# Patient Record
Sex: FEMALE | Race: OTHER | Employment: UNEMPLOYED | URBAN - METROPOLITAN AREA
[De-identification: names, ages, dates, MRNs, and addresses within clinical notes are randomized per-mention and may not be internally consistent; named-entity substitution may affect disease eponyms.]

---

## 2017-02-22 ENCOUNTER — OFFICE VISIT (OUTPATIENT)
Dept: ORTHOPEDIC SURGERY | Age: 52
End: 2017-02-22

## 2017-02-22 VITALS
BODY MASS INDEX: 28.5 KG/M2 | HEART RATE: 70 BPM | SYSTOLIC BLOOD PRESSURE: 126 MMHG | DIASTOLIC BLOOD PRESSURE: 88 MMHG | WEIGHT: 171.08 LBS | HEIGHT: 65 IN

## 2017-02-22 DIAGNOSIS — T84.84XA PAINFUL ORTHOPAEDIC HARDWARE (HCC): ICD-10-CM

## 2017-02-22 DIAGNOSIS — M25.531 WRIST PAIN, RIGHT: Primary | ICD-10-CM

## 2017-02-22 DIAGNOSIS — S52.501D CLOSED FRACTURE OF DISTAL END OF RIGHT RADIUS WITH ROUTINE HEALING, UNSPECIFIED FRACTURE MORPHOLOGY, SUBSEQUENT ENCOUNTER: ICD-10-CM

## 2017-02-22 PROCEDURE — 73110 X-RAY EXAM OF WRIST: CPT | Performed by: ORTHOPAEDIC SURGERY

## 2017-02-22 PROCEDURE — 99213 OFFICE O/P EST LOW 20 MIN: CPT | Performed by: ORTHOPAEDIC SURGERY

## 2019-08-12 ENCOUNTER — OFFICE VISIT (OUTPATIENT)
Dept: ORTHOPEDIC SURGERY | Age: 54
End: 2019-08-12
Payer: COMMERCIAL

## 2019-08-12 VITALS — WEIGHT: 180 LBS | HEIGHT: 65 IN | BODY MASS INDEX: 29.99 KG/M2 | RESPIRATION RATE: 16 BRPM

## 2019-08-12 DIAGNOSIS — M79.672 LEFT FOOT PAIN: Primary | ICD-10-CM

## 2019-08-12 DIAGNOSIS — S93.602A FOOT SPRAIN, LEFT, INITIAL ENCOUNTER: ICD-10-CM

## 2019-08-12 PROCEDURE — 99213 OFFICE O/P EST LOW 20 MIN: CPT | Performed by: PHYSICIAN ASSISTANT

## 2020-09-20 ENCOUNTER — APPOINTMENT (OUTPATIENT)
Dept: GENERAL RADIOLOGY | Age: 55
End: 2020-09-20
Payer: COMMERCIAL

## 2020-09-20 ENCOUNTER — HOSPITAL ENCOUNTER (EMERGENCY)
Age: 55
Discharge: HOME | End: 2020-09-21
Attending: STUDENT IN AN ORGANIZED HEALTH CARE EDUCATION/TRAINING PROGRAM
Payer: COMMERCIAL

## 2020-09-20 LAB
A/G RATIO: 1.6 (ref 1.1–2.2)
ACETAMINOPHEN LEVEL: <5 UG/ML (ref 10–30)
ALBUMIN SERPL-MCNC: 4.6 G/DL (ref 3.4–5)
ALP BLD-CCNC: 71 U/L (ref 40–129)
ALT SERPL-CCNC: 10 U/L (ref 10–40)
AMPHETAMINE SCREEN, URINE: NORMAL
ANION GAP SERPL CALCULATED.3IONS-SCNC: 11 MMOL/L (ref 3–16)
AST SERPL-CCNC: 21 U/L (ref 15–37)
BARBITURATE SCREEN URINE: NORMAL
BASOPHILS ABSOLUTE: 0.1 K/UL (ref 0–0.2)
BASOPHILS RELATIVE PERCENT: 1 %
BENZODIAZEPINE SCREEN, URINE: NORMAL
BILIRUB SERPL-MCNC: <0.2 MG/DL (ref 0–1)
BUN BLDV-MCNC: 11 MG/DL (ref 7–20)
CALCIUM SERPL-MCNC: 9.4 MG/DL (ref 8.3–10.6)
CANNABINOID SCREEN URINE: NORMAL
CHLORIDE BLD-SCNC: 103 MMOL/L (ref 99–110)
CO2: 27 MMOL/L (ref 21–32)
COCAINE METABOLITE SCREEN URINE: NORMAL
CREAT SERPL-MCNC: 0.7 MG/DL (ref 0.6–1.1)
EOSINOPHILS ABSOLUTE: 0.1 K/UL (ref 0–0.6)
EOSINOPHILS RELATIVE PERCENT: 1.7 %
ETHANOL: 251 MG/DL (ref 0–0.08)
GFR AFRICAN AMERICAN: >60
GFR NON-AFRICAN AMERICAN: >60
GLOBULIN: 2.8 G/DL
GLUCOSE BLD-MCNC: 133 MG/DL (ref 70–99)
HCG QUALITATIVE: NEGATIVE
HCT VFR BLD CALC: 38.7 % (ref 36–48)
HEMOGLOBIN: 12.2 G/DL (ref 12–16)
LYMPHOCYTES ABSOLUTE: 3.5 K/UL (ref 1–5.1)
LYMPHOCYTES RELATIVE PERCENT: 51.8 %
Lab: NORMAL
MCH RBC QN AUTO: 22.8 PG (ref 26–34)
MCHC RBC AUTO-ENTMCNC: 31.4 G/DL (ref 31–36)
MCV RBC AUTO: 72.6 FL (ref 80–100)
METHADONE SCREEN, URINE: NORMAL
MONOCYTES ABSOLUTE: 0.3 K/UL (ref 0–1.3)
MONOCYTES RELATIVE PERCENT: 5.1 %
NEUTROPHILS ABSOLUTE: 2.7 K/UL (ref 1.7–7.7)
NEUTROPHILS RELATIVE PERCENT: 40.4 %
OPIATE SCREEN URINE: NORMAL
OXYCODONE URINE: NORMAL
PDW BLD-RTO: 15.5 % (ref 12.4–15.4)
PH UA: 5
PHENCYCLIDINE SCREEN URINE: NORMAL
PLATELET # BLD: 291 K/UL (ref 135–450)
PMV BLD AUTO: 8.9 FL (ref 5–10.5)
POTASSIUM REFLEX MAGNESIUM: 4.1 MMOL/L (ref 3.5–5.1)
PROPOXYPHENE SCREEN: NORMAL
RBC # BLD: 5.33 M/UL (ref 4–5.2)
SALICYLATE, SERUM: <0.3 MG/DL (ref 15–30)
SODIUM BLD-SCNC: 141 MMOL/L (ref 136–145)
TOTAL PROTEIN: 7.4 G/DL (ref 6.4–8.2)
TROPONIN: <0.01 NG/ML
WBC # BLD: 6.7 K/UL (ref 4–11)

## 2020-09-20 PROCEDURE — 80053 COMPREHEN METABOLIC PANEL: CPT

## 2020-09-20 PROCEDURE — 85025 COMPLETE CBC W/AUTO DIFF WBC: CPT

## 2020-09-20 PROCEDURE — G0480 DRUG TEST DEF 1-7 CLASSES: HCPCS

## 2020-09-20 PROCEDURE — 99284 EMERGENCY DEPT VISIT MOD MDM: CPT

## 2020-09-20 PROCEDURE — 84484 ASSAY OF TROPONIN QUANT: CPT

## 2020-09-20 PROCEDURE — 71046 X-RAY EXAM CHEST 2 VIEWS: CPT

## 2020-09-20 PROCEDURE — 84703 CHORIONIC GONADOTROPIN ASSAY: CPT

## 2020-09-20 PROCEDURE — 80307 DRUG TEST PRSMV CHEM ANLYZR: CPT

## 2020-09-20 PROCEDURE — 93005 ELECTROCARDIOGRAM TRACING: CPT | Performed by: PHYSICIAN ASSISTANT

## 2020-09-20 RX ORDER — PREDNISONE 1 MG/1
1 TABLET ORAL DAILY
COMMUNITY

## 2020-09-20 ASSESSMENT — HEART SCORE: ECG: 1

## 2020-09-21 VITALS
RESPIRATION RATE: 18 BRPM | SYSTOLIC BLOOD PRESSURE: 131 MMHG | TEMPERATURE: 97.8 F | HEIGHT: 65 IN | WEIGHT: 180 LBS | OXYGEN SATURATION: 96 % | BODY MASS INDEX: 29.99 KG/M2 | HEART RATE: 70 BPM | DIASTOLIC BLOOD PRESSURE: 98 MMHG

## 2020-09-21 LAB
EKG ATRIAL RATE: 76 BPM
EKG DIAGNOSIS: NORMAL
EKG P AXIS: 52 DEGREES
EKG P-R INTERVAL: 166 MS
EKG Q-T INTERVAL: 420 MS
EKG QRS DURATION: 72 MS
EKG QTC CALCULATION (BAZETT): 472 MS
EKG R AXIS: -2 DEGREES
EKG T AXIS: 25 DEGREES
EKG VENTRICULAR RATE: 76 BPM
ETHANOL: NORMAL MG/DL (ref 0–0.08)

## 2020-09-21 PROCEDURE — 93010 ELECTROCARDIOGRAM REPORT: CPT | Performed by: INTERNAL MEDICINE

## 2020-09-21 PROCEDURE — G0480 DRUG TEST DEF 1-7 CLASSES: HCPCS

## 2020-09-21 NOTE — ED NOTES
RN at pt bedside to assess pt and introduce self as primary RN pt tearful and states \" I just wanted to go to bed, my  has been nagging me to do housework and I haven't been feeling well the last two weeks and he is no supportive when I don't feel well. \"  Pt calm and cooperative at this time after speaking with RN pt has call button within reach, denies needs or concerns.  No distress noted at this time      Dante Phalen, RN  09/20/20 6974

## 2020-09-21 NOTE — ED NOTES
Collateral Contact:  Name: Reginaldo Lyon  Phone: [phone]  Relation to Patient:   Last Contact with Patient: Today  Concerns:  states pt drinks daily and she has taken pills and acted impulsively in the past when intoxicated. He states, \"She does this all the time. \"  states he would like pt to go to rehab but he does not believe she would go on her own. He denies that she has ever been admitted into a psychiatric facility. He states he does not know if pt taking the pills last evening was a suicide attempt. He states he does feel she would be safe if she returned home, however, believes she will continue to act impulsively when intoxicated. He states he does not feel she would benefit from a psychiatric admission and is hoping staff here can provide referrals to [facility].  states he will be home with pt at all times this evening and will help her follow up with any referrals given if discharged. , Reginaldo Lyon, would be supportive of a plan to discharge [facility].      [address] [place] [address]  09/21/20 4726

## 2020-09-21 NOTE — ED NOTES
Bed: 04  Expected date:   Expected time:   Means of arrival:   Comments:  Mary Grant, SONALI  09/20/20 2124

## 2020-09-21 NOTE — ED NOTES
2336 Perfect served hospitalist per Felipe Castellon 46 Dr. Eliot Yanez returned call to 143 Maryanne Khalil  09/20/20 2339       Gadiel  09/20/20 5622

## 2020-09-21 NOTE — ED PROVIDER NOTES
I independently examined and evaluated Tha Beverly. In brief, 58-year-old female with no past medical history presenting for ingestion. Patient has been following with her PCP for 2 weeks with severe symptoms of sinusitis and bronchitis. Patient is still having symptoms. She was recently started on prednisone and Singulair. She reports increased depression after starting prednisone. She also reports multiple members of her family passing away within the past 6 months. EMS was called by patient's friend when patient lost consciousness while on the phone with her friend. Patient endorses that she took 10-12 melatonin tablets that she believes are 5 mg. Patient denies this was a self-harm attempt, stating she just wanted to sleep. However she is very tearful on exam and reports significant depression. Patient only reports drinking 1 glass of wine this evening. Suicidal ideation: denies  Plan: denies  Previous attempts: denies  Homicidal ideation: denies  Access to firearms: denies  Audiovisual hallucinations: denies  Psychiatric medications: denies  Tobacco use: denies  Alcohol use: occasional  Illicit drug use: denies    Somatic complaints: denies new somatic complaints    Focused exam revealed patient is very tearful and emotionally labile. Cardiac regular rate and rhythm, lungs clear to auscultation bilaterally. Abdomen soft and nontender. Strength and sensation intact. Pupils equal and reactive to light. Patient is alert and oriented. She does not appear to be responding to internal stimuli. ED course:     Patient presenting with ingestion of melatonin. She denies this is a suicide attempt. However, patient reports significant depression and reports taking more medication than she knows she should. We will obtain ingestion labs. While patient denies this was a self-harm attempt, patient is extremely emotionally labile and reporting severe depression.   I do have concerns that given patient has ingested more tablets than she knows she should in the setting of severe depression, that this does potentially represent intentional self harming behavior. Patient also reported strong family history for cardiac disease with multiple family members dying within the past 6 months of cardiac causes. Patient is concerned about cardiac disease. She denies any shortness of breath or chest pain. EKG, chest x-ray, and troponin obtained. The Ekg interpreted by me shows  normal sinus rhythm with a rate of 76  Axis is   Left axis deviation  QTc is  within an acceptable range  Intervals and Durations are unremarkable. ST Segments: nonspecific changes  No previous for comparison    XR CHEST (2 VW)   Final Result   No acute process. Troponin was within normal limits. At this time, low suspicion for ACS. Patient is not endorsing any chest pain or shortness of breath, just endorses anxiety due to will family members recently dying from cardiac disease. Patient did have a exercise stress test in 2012 that was unremarkable. Heart score of 2, placing patient at low risk for acute major cardiac event in the next 6 weeks. Patient encouraged to follow-up with her primary care doctor regarding her concerns about her family cardiac history for further work-up as needed. Patient evaluated for ingestion. ED Course as of Sep 20 2311   Sun Sep 20, 2020   2259 Per ANNETTE Landis conversation with poison control, there is no concern for toxicity based off of the amount of melatonin that the patient took. [ER]   2300 Alcohol level elevated to 251. Patient reports she only drank 1 glass of wine. This alcohol level does not correspond with this amount of alcohol use. I discussed this with the patient and she continued to endorse that she only drank 1 glass of wine. [ER]   2302 Acetaminophen and salicylate levels negative.     [ER]   7612 No electrolyte abnormalities or evidence of kidney dysfunction.    [ER]   2303 Liver function testing unremarkable. [ER]   7720 Patient is not pregnant.    [ER]   [phone] No leukocytosis, anemia, or thrombocytopenia. [ER]   2309 Urine drug screen negative. [ER]      ED Course User Index  [CS] ANNETTE Figueroa  [ER] Yossi Michel MD     Patient cleared from an ingestion standpoint. Poison control stated that this was a nontoxic melatonin ingestion. Other ingestion labs unremarkable. Patient's alcohol is elevated. Do feel that she requires psychiatric evaluation and patient meets criteria for admission while obtaining sobriety. Discussed with hospitalist, this patient would require admission to the last available ICU bed. He discussed with the WALTER who is comfortable with the patient being placed in the [facility] while awaiting sobriety for psychiatric evaluation in the morning. At this time, I do feel this is the best use of hospital resources. We will plan for repeat evaluation of sobriety in the morning with plan for psychiatric evalualtation at that time. All diagnostic, treatment, and disposition decisions were made by myself in conjunction with the advanced practice provider. For all further details of the patient's emergency department visit, please see the advanced practice provider's documentation. Comment: Please note this report has been produced using speech recognition software and may contain errors related to that system including errors in grammar, punctuation, and spelling, as well as words and phrases that may be inappropriate. If there are any questions or concerns please feel free to contact the dictating provider for clarification.       Yossi Michel MD  09/21/20 0040

## 2020-09-21 NOTE — ED PROVIDER NOTES
[address] ED  EMERGENCY DEPARTMENT ENCOUNTER        Pt Name: Nacho Turner  MRN: [id number]  [place] 1965  Date of evaluation: 9/20/2020  Provider: ANNETTE Pizano  PCP: No primary care provider on file. This patient was seen and evaluated by the attending physician Too Jewell MD.    I have evaluated this patient. My supervising physician was available for consultation. CHIEF COMPLAINT       Chief Complaint   Patient presents with    Ingestion     pt took handful of melatonin gummies, pt is wanting to get away from , pt is also intoxicated, states drank one glass of wine       HISTORY OF PRESENT ILLNESS   (Location/Symptom, Timing/Onset, Context/Setting, Quality, Duration, Modifying Factors, Severity)  Note limiting factors. Nacho Turner is a 63 y.o. female who presents via EMS from her home for evaluation of an ingestion. Around 9 PM today patient took 10-12 melatonin. She is not entirely sure what the dose was she notes approximately 5 mg she thinks. She denies doing this to harm herself she notes \"I just wanted to sleep\". She notes that she has been treated for the last several weeks for different URI symptoms, her PCP has put her on antibiotics for sinusitis, bronchopneumonia, bronchospasm. She was just recently started on prednisone and Singulair, she took that yesterday for the first time. She noted that when she took the prednisone she felt \"very depressed\". Again, patient denies any suicidal or homicidal thoughts or ideation and denies intent for self-harm however she also notes that she drank only 1 glass of wine today. She notes that she was on the phone with a friend when she suddenly fell asleep and started snoring and the friend called 911 which brought her to the emergency department. Currently she denies any headaches vision changes nausea vomiting abdominal pain. She endorses cough and chronic runny nose and nasal congestion. She also notes that multiple people in her family have  from heart attack, she does not think that this is related but wanted us to be aware. He denies any chest pain or shortness of breath. She denies any other drug use. She denies prior attempt at self-harm. She denies past medical history of depression but endorses past medical history of anxiety she does not take anything on a regular basis for this. She denies any visual or auditory hallucinations. Nursing Notes were all reviewed and agreed with or any disagreements were addressed  in the HPI. REVIEW OF SYSTEMS    (2-9 systems for level 4, 10 or more for level 5)     Review of Systems    Positives and Pertinent negatives as per HPI. Except as noted abovein the ROS, all other systems were reviewed and negative. PAST MEDICAL HISTORY     Past Medical History:   Diagnosis Date    Endometriosis     Fe deficiency anemia     Irregular uterine bleeding          SURGICAL HISTORY     Past Surgical History:   Procedure Laterality Date    ARM SURGERY Right 2016    orif distal radial fracture    HYSTERECTOMY  2008    subtotal    PELVIC LAPAROSCOPY           CURRENTMEDICATIONS       Previous Medications    DEXLANSOPRAZOLE (DEXILANT) 60 MG CPDR DELAYED RELEASE CAPSULE    Take 1 capsule by mouth daily    IBUPROFEN (ADVIL;MOTRIN) 600 MG TABLET    Take 1 tablet by mouth every 6 hours as needed for Pain    PHENAZOPYRIDINE (PYRIDIUM) 200 MG TABLET    Take 200 mg by mouth 3 times daily as needed for Pain    PREDNISONE (DELTASONE) 1 MG TABLET    Take 1 mg by mouth daily         ALLERGIES     Patient has no known allergies.     FAMILYHISTORY       Family History   Problem Relation Age of Onset    Heart Disease Mother     Stroke Mother     Cancer Brother         lung    High Blood Pressure Sister     High Cholesterol Paternal Grandmother     Heart Disease Brother           SOCIAL HISTORY       Social History     Socioeconomic History    She is not in acute distress. Appearance: Normal appearance. She is well-developed. She is not ill-appearing, toxic-appearing or diaphoretic. Interventions: She is not intubated. HENT:      Head: Normocephalic and atraumatic. Right Ear: Tympanic membrane, ear canal and external ear normal.      Left Ear: Tympanic membrane, ear canal and external ear normal.      Nose: Rhinorrhea present. No congestion. Mouth/Throat:      Mouth: Mucous membranes are moist.      Pharynx: Oropharynx is clear. No oropharyngeal exudate or posterior oropharyngeal erythema. Eyes:      General:         Right eye: No discharge. Left eye: No discharge. Extraocular Movements: Extraocular movements intact. Conjunctiva/sclera: Conjunctivae normal.      Pupils: Pupils are equal, round, and reactive to light. Neck:      Musculoskeletal: Normal range of motion and neck supple. No neck rigidity or muscular tenderness. Cardiovascular:      Rate and Rhythm: Normal rate and regular rhythm. Pulses: Normal pulses. Radial pulses are 2+ on the right side and 2+ on the left side. Posterior tibial pulses are 2+ on the right side and 2+ on the left side. Heart sounds: Normal heart sounds. No murmur. No friction rub. No gallop. Pulmonary:      Effort: Pulmonary effort is normal. No tachypnea, bradypnea, accessory muscle usage, prolonged expiration, respiratory distress or retractions. She is not intubated. Breath sounds: Normal breath sounds and air entry. No stridor. No decreased breath sounds, wheezing, rhonchi or rales. Comments: Mild intermittent nonproductive cough. Abdominal:      Palpations: Abdomen is soft. Tenderness: There is no abdominal tenderness. There is no right CVA tenderness, left CVA tenderness, guarding or rebound. Musculoskeletal:      Right lower leg: No edema. Left lower leg: No edema. Lymphadenopathy:      Cervical: No cervical adenopathy. Skin:     General: Skin is warm and dry. Capillary Refill: Capillary refill takes less than 2 seconds. Findings: No lesion or rash. Neurological:      General: No focal deficit present. Mental Status: She is alert, oriented to person, place, and time and easily aroused. GCS: GCS eye subscore is 4. GCS verbal subscore is 5. GCS motor subscore is 6. Cranial Nerves: No dysarthria or facial asymmetry. Sensory: Sensation is intact. Motor: Motor function is intact. No tremor, abnormal muscle tone or seizure activity. Coordination: Finger-Nose-Finger Test and Heel to [employer] normal. Rapid alternating movements normal.   Psychiatric:         Attention and Perception: Attention and perception normal.         Mood and Affect: Mood is anxious. Affect is tearful. Speech: Speech normal.         Behavior: Behavior is cooperative. Thought Content: Thought content is not paranoid or delusional. Thought content includes suicidal (Questionable intent to self harm) ideation. Thought content does not include homicidal ideation. Thought content includes suicidal (Questionable intent to self harm ) plan. Thought content does not include homicidal plan. Cognition and Memory: Cognition and memory normal.         Judgment: Judgment is impulsive.            DIAGNOSTIC RESULTS   LABS:    Labs Reviewed   CBC WITH AUTO DIFFERENTIAL - Abnormal; Notable for the following components:       Result Value    RBC 5.33 (*)     MCV 72.6 (*)     MCH 22.8 (*)     RDW 15.5 (*)     All other components within normal limits    Narrative:     Performed at:  [facility],  [place], [facility]   Phone [phone]   COMPREHENSIVE METABOLIC PANEL W/ REFLEX TO MG FOR LOW K - Abnormal; Notable for the following components:    Glucose 133 (*)     All other components within normal limits    Narrative:     Performed at:  [address] [place] [address] [address]  [place], [place]   Phone [phone]   SALICYLATE LEVEL - Abnormal; Notable for the following components:    Salicylate, Serum <3.6 (*)     All other components within normal limits    Narrative:     Performed at:  [facility],  [place], [place]   Phone [phone]   ACETAMINOPHEN LEVEL - Abnormal; Notable for the following components:    Acetaminophen Level <5 (*)     All other components within normal limits    Narrative:     Performed at:  [facility] [address],  [place], [place]   Phone [phone]   HCG, SERUM, QUALITATIVE    Narrative:     Performed at:  [facility] [address],  [place], [place]   Phone [phone]   URINE DRUG SCREEN    Narrative:     Performed at:  [facility] [address],  [place], [place]   Phone [phone]   ETHANOL    Narrative:     Performed at:  [facility],  [place], [place]   Phone [phone]   TROPONIN    Narrative:     Performed at:  [facility]) [facility],  [place], [place]   Phone [phone]   ETHANOL       All other labs were within normal range or not returned as of this dictation. EKG: All EKG's are interpreted by the Emergency Department Physician who either signs orCo-signs this chart in the absence of a cardiologist.  Please see their note for interpretation of EKG. RADIOLOGY:   Non-plain film images such as CT, Ultrasound and MRI are read by the radiologist. Bird Tejeda radiographic images are visualized andpreliminarily interpreted by the  ED Provider with the below findings:        Interpretation perthe Radiologist below, if available at the time of this note:    XR CHEST (2 VW)   Final Result   No acute process.            Umu Lomas Chest (2 Vw)    Result Date: 9/20/2020  EXAMINATION: TWO XRAY VIEWS OF THE CHEST 9/20/2020 10:24 pm COMPARISON: None. HISTORY: ORDERING SYSTEM PROVIDED HISTORY: cough TECHNOLOGIST PROVIDED HISTORY: Reason for exam:->cough Reason for Exam: Ingestion (pt took handful of melatonin gummies, pt is wanting to get away from , pt is also intoxicated, states drank one glass of wine) FINDINGS: The lungs are without acute focal process. There is no effusion or pneumothorax. The cardiomediastinal silhouette is without acute process. The osseous structures are without acute process. No acute process. PROCEDURES   Unless otherwise noted below, none     Procedures    CRITICAL CARE TIME   N/A    CONSULTS:  IP CONSULT TO HOSPITALIST      EMERGENCY DEPARTMENT COURSE and DIFFERENTIALDIAGNOSIS/MDM:   Vitals:    Vitals:    09/20/20 2129 09/20/20 2136 09/20/20 2305 09/21/20 0105   BP: 99/71 121/81 110/74 102/73   Pulse: 71  72 68   Resp: 18      Temp: 97.8 °F (36.6 °C)      TempSrc: Oral      SpO2: 96% 96% 96% 94%   Weight: 180 lb (81.6 kg)      Height: 5' 5\" (1.651 m)          Patient was given thefollowing medications:  Medications - No data to display    PDMP Monitoring:    Last PDMP Blayne Christensen as Reviewed [facility]):  Review User Review Instant Review Result          Last Controlled Substance Monitoring Documentation      ED from 9/20/2016 in [facility]  ED   Attestation  The Prescription Monitoring Report for this patient was reviewed today. filed at 09/20/2016 2243   Periodic Controlled Substance Monitoring  Possible medication side effects, risk of tolerance and/or dependence, and alternative treatments discussed, No signs of potential drug abuse or diversion identified.  filed at 09/20/2016 0813        Urine Drug Screenings (1 yr)     Urine Drug Screen  Collected: 9/20/2020 10:02 PM (Final result)    Narrative:  Performed at:  [facility]) - [place] [address],  [place], [place] [address]   Phone [phone]    Complete Results              Medication Contract and Consent for Opioid Use Documents Filed      No documents found                MDM:   Patient seen and evaluated. Old records reviewed. Diagnostic testing reviewed and results discussed. Patient is a 58-year-old female who presents for evaluation of ingestion. On exam she is alert oriented afebrile well-perfused hemodynamically stable nontoxic in appearance. She remains with stable vital signs throughout her ER stay. On my interview and discussion with the patient she is tearful, noting that she did not intent to harm herself however she also notes that she drank only 1 glass of wine and her alcohol level is 250. I have concern regarding her truthfulness and I have concern that she did take the melatonin with the intent to harm herself. She has no focal neuro deficits her abdomen soft nontender no peritoneal signs, no other sign of self-harm or self injury. Patient notes that she is fearful that she is suffering from heart disease however she has not ever been seen by a cardiologist.  Given this fever and her concern and the fact that there can be some mimic of URI symptoms with ACS in adult woman I did obtain a troponin and twelve-lead EKG; troponin is negative, her twelve-lead is negative for sign of acute ischemia today and her chest x-ray is without focal process effusion without focal process to the cardiomediastinal silhouette, no acute process identified. Other drugs of abuse are negative. I did speak with Mani Clark, pharmacist at [address] [place] [address],[address] control who noted that melatonin toxicity level in children is not considered until 375 mg have been reached. Even if patient took the maximum over-the-counter dose of 20 mg and took that it 12 pills she is still just barely over this toxic level in a small child let alone what it would be an adult. Little concern given for melatonin toxicity currently.       At this time I do advise that patient be evaluated by behavioral health however her alcohol level is prohibiting proper evaluation at this time. Patient will be observed in the emergency department and alcohol tested in several hours. She is agreeable to this. 1:45 AM: I discussed the history, physical, and treatment plan with Dr. Kandice Templeokoor was signed out in stable condition. Please see Dr. Anamika RAHMAN's note for further details, including diagnosis and disposition. FINAL IMPRESSION      1. Ingestion of nontoxic substance, undetermined intent, initial encounter    2. Acute alcoholic intoxication without complication ([facility] [address] [facility]    3. Depression, unspecified depression type    4. Family history of coronary artery disease          DISPOSITION/PLAN   DISPOSITION Ed Observation 09/21/2020 12:41:01 AM      PATIENT REFERREDTO:  No follow-up provider specified. DISCHARGE MEDICATIONS:  New Prescriptions    No medications on file       DISCONTINUED MEDICATIONS:  Discontinued Medications    LANSOPRAZOLE (PREVACID) 30 MG DELAYED RELEASE CAPSULE    Take 1 capsule by mouth daily    ZOLPIDEM (AMBIEN CR) 12.5 MG CR TABLET    Take 1 tablet by mouth nightly as needed for Sleep.               (Please note that portions ofthis note were completed with a voice recognition program.  Efforts were made to edit the dictations but occasionally words are mis-transcribed.)    Nelosn Qiu (electronically signed)       Nelson Qiu  09/21/20 4183

## 2020-09-21 NOTE — ED NOTES
Level of Care Disposition: Discharge    Patient was seen by ED provider and [facility] AN AFFILIATE OF [facility] staff. The case was presented to psychiatric provider on-call Dr. Rudy Ledezma. Based on the ED evaluation and information presented to the provider by this writer the decision was made to discharge patient with SA and mental health tx referrals. RATIONALE FOR NON-ADMISSION:  The patient does not meet criteria for an involuntary psychiatric admission because she does not present as an imminent risk to herself or another person. She is denying all SI, plan, and intent. She is future oriented with plans to attend work tomorrow. Collateral was obtained from pt's , No Sotelo, who feels safe for pt to return home. Patient has demonstrated a reasonable safety plan to return home with  and follow up with referrals.            [address] [place] [address]  09/21/20 9740

## 2020-09-21 NOTE — ED PROVIDER NOTES
[address] ED  Emergency Department  Faculty Sign-Out Addendum     Care of Patrick Beverly was assumed from previous attending and is being seen for Ingestion (pt took handful of melatonin gummies, pt is wanting to get away from , pt is also intoxicated, states drank one glass of wine)  . The patient's initial evaluation and plan have been discussed with the prior provider who initially evaluated the patient. Handoff taken on the following patient from prior Attending Physician:    Samara Manriquez    I was available and discussed any additional care issues that arose and coordinated the management plans with the resident(s) caring for the patient during my duty period. Any areas of disagreement with residents documentation of care or procedures are noted on the chart. I was personally present for the key portions of any/all procedures during my duty period. I have documented in the chart those procedures where I was not present during the key portions. EMERGENCY DEPARTMENT COURSE / MEDICAL DECISION MAKING:       MEDICATIONS GIVEN:  No orders of the defined types were placed in this encounter.       LABS / RADIOLOGY:     Labs Reviewed   CBC WITH AUTO DIFFERENTIAL - Abnormal; Notable for the following components:       Result Value    RBC 5.33 (*)     MCV 72.6 (*)     MCH 22.8 (*)     RDW 15.5 (*)     All other components within normal limits    Narrative:     Performed at:  [facility] [address],  [place], [facility]   Phone [phone]   COMPREHENSIVE METABOLIC PANEL W/ REFLEX TO MG FOR LOW K - Abnormal; Notable for the following components:    Glucose 133 (*)     All other components within normal limits    Narrative:     Performed at:  [facility]) - [facility] [address],  [place], [facility]   Phone [phone]   SALICYLATE LEVEL - Abnormal; Notable for the following components:    Salicylate, Serum <4.4 (*) All other components within normal limits    Narrative:     Performed at:  [facility] ([facility]) - [facility] [address],  [place], [place]   Phone [phone]   ACETAMINOPHEN LEVEL - Abnormal; Notable for the following components:    Acetaminophen Level <5 (*)     All other components within normal limits    Narrative:     Performed at:  [facility] [address],  [place], [place]   Phone [phone]   HCG, SERUM, QUALITATIVE    Narrative:     Performed at:  [facility] [address],  [place], [facility]   Phone [phone]   URINE DRUG SCREEN    Narrative:     Performed at:  [place] [address],  [place], [place]   Phone [phone]   ETHANOL    Narrative:     Performed at:  [facility] [address],  [place], [place]   Phone [phone]   TROPONIN    Narrative:     Performed at:  [place] [address],  [place], [place]   Phone [phone]   ETHANOL    Narrative:     Performed at:  [place] [address],  [place], [place]   Phone [phone]       Xr Chest (2 Vw)    Result Date: 9/20/2020  EXAMINATION: TWO XRAY VIEWS OF THE CHEST 9/20/2020 10:24 pm COMPARISON: None. HISTORY: ORDERING SYSTEM PROVIDED HISTORY: cough TECHNOLOGIST PROVIDED HISTORY: Reason for exam:->cough Reason for Exam: Ingestion (pt took handful of melatonin gummies, pt is wanting to get away from , pt is also intoxicated, states drank one glass of wine) FINDINGS: The lungs are without acute focal process. There is no effusion or pneumothorax. The cardiomediastinal silhouette is without acute process. The osseous structures are without acute process. No acute process.        RECENT VITALS:     Temp: 97.8 °F (36.6 °C),  Pulse: 70, Resp: 18, BP: (!) 140/92, SpO2: 96 %    This patient is a 63 y.o. Female with presented with overdose, patient consumed roughly 10 pills of melatonin as well as alcohol consumption. I will see patient in signout, at that time she was medically cleared awaiting for psych evaluation    Patient was evaluated by psychiatry, patient was cleared for discharge home. Patient was updated on plan. Patient to me was voicing no concerns of suicidal ideation. OUTSTANDING TASKS / RECOMMENDATIONS:    1. Psych evaluation, dispo          FINAL IMPRESSION      1. Ingestion of nontoxic substance, undetermined intent, initial encounter    2. Acute alcoholic intoxication without complication ([facility]    3. Depression, unspecified depression type    4. Family history of coronary artery disease          DISPOSITION/PLAN   DISPOSITION Decision To Discharge 09/21/2020 02:46:21 PM      PATIENT REFERRED TO:  [place] [facility]  [address] [facility]  [phone]    If symptoms worsen      DISCHARGE MEDICATIONS:  New Prescriptions    No medications on file     Controlled Substances Monitoring:     RX Monitoring 9/20/2016   Attestation The Prescription Monitoring Report for this patient was reviewed today. Periodic Controlled Substance Monitoring Possible medication side effects, risk of tolerance and/or dependence, and alternative treatments discussed; No signs of potential drug abuse or diversion identified.        (Please note that portions of this note were completed with a voice recognition program.  Efforts were made to edit the dictations but occasionally words are mis-transcribed.)    Pete Meng DO (electronically signed)  Attending Emergency Physician            Pete Meng DO  Emergency Physician  [address] ED        Pete Meng DO  09/21/20 6589

## 2020-09-21 NOTE — ED NOTES
Medic student and RN performed straight stick on pt in order to obtain blood specimen. Pt tolerated well.       Chiquis Larson RN  09/21/20 0574

## 2020-09-21 NOTE — ED NOTES
Active substance abuse   []  Highly impulsive behaviors   []  Not attending to self-care/ADLs    [x]  Recent significant loss   [x]  Chronic pain or medical illness   []  Social isolation   []  History of violence   []  Active psychosis   []  Cognitive impairment    []  No outpatient services in place   []  Medication noncompliance   []  No collateral information to support safety     PROTECTIVE FACTORS:  [] Age >25 and <55  [x] Female gender   [] Denies depression  [x] Denies suicidal ideation  [x] Does not have lethal plan   [x] Does not have access to guns or weapons  [x] Patient is verbally les for safety  [x] No prior suicide attempts  [] No active substance abuse  [x] Patient has social or family support  [x] No active psychosis or cognitive dysfunction  [] Physically healthy  [x] Has outpatient services in place  [x] Compliant with recommended medications  [] Collateral information from. ..supports patient safety   [x] Patient is future oriented with plans to return to work tomorrow    Clinical Summary:    Patient presents to [facility] voluntarily after she was brought in. Patient was clinically sober at the time of the evaluation. Patient was evaluated and offered supportive counseling. Pt states she has been sick for the past couple months with cough and shortness of breath. She states she was recently started on Prednisone and she believes this has caused an increase in her depression. Pt states last night she was trying to sleep and so she drank some wine and took a handful of Melatonin gummies to try and sleep. Pt denies this as a suicide attempt and is adamant that she is not suicidal. Pt states she was struggling with grief after the passing of several family members 2 years ago. She states she also recently lost her brother and she is still grieving from this loss.  She states she was seeing a SW in her PCP's office prior to COVID-19 and plans to reach out to the office to initiate tele-health therapy visits. Pt states she is embarrassed by her actions and states she would like to return home. Pt states she would like to return to work tomorrow and is requesting to have her  pick her up. She continues to deny all SI, plan and intent.           [address] [place] [address], [address] [facility]  09/21/20 9902

## 2022-05-24 ENCOUNTER — HOSPITAL ENCOUNTER (EMERGENCY)
Age: 57
Discharge: HOME | End: 2022-05-25
Attending: EMERGENCY MEDICINE
Payer: COMMERCIAL

## 2022-05-24 ENCOUNTER — APPOINTMENT (OUTPATIENT)
Dept: GENERAL RADIOLOGY | Age: 57
End: 2022-05-24
Payer: COMMERCIAL

## 2022-05-24 DIAGNOSIS — S52.602A CLOSED FRACTURE OF DISTAL ENDS OF LEFT RADIUS AND ULNA, INITIAL ENCOUNTER: Primary | ICD-10-CM

## 2022-05-24 DIAGNOSIS — S52.502A CLOSED FRACTURE OF DISTAL ENDS OF LEFT RADIUS AND ULNA, INITIAL ENCOUNTER: Primary | ICD-10-CM

## 2022-05-24 PROCEDURE — 29125 APPL SHORT ARM SPLINT STATIC: CPT

## 2022-05-24 PROCEDURE — 99284 EMERGENCY DEPT VISIT MOD MDM: CPT

## 2022-05-24 PROCEDURE — 73110 X-RAY EXAM OF WRIST: CPT

## 2022-05-24 RX ORDER — KETOROLAC TROMETHAMINE 30 MG/ML
15 INJECTION, SOLUTION INTRAMUSCULAR; INTRAVENOUS ONCE
Status: DISCONTINUED | OUTPATIENT
Start: 2022-05-25 | End: 2022-05-25

## 2022-05-24 ASSESSMENT — PAIN SCALES - GENERAL: PAINLEVEL_OUTOF10: 10

## 2022-05-24 ASSESSMENT — PAIN - FUNCTIONAL ASSESSMENT: PAIN_FUNCTIONAL_ASSESSMENT: 0-10

## 2022-05-25 VITALS
BODY MASS INDEX: 29.99 KG/M2 | HEIGHT: 65 IN | OXYGEN SATURATION: 95 % | TEMPERATURE: 97.8 F | SYSTOLIC BLOOD PRESSURE: 122 MMHG | HEART RATE: 84 BPM | WEIGHT: 180 LBS | RESPIRATION RATE: 18 BRPM | DIASTOLIC BLOOD PRESSURE: 90 MMHG

## 2022-05-25 PROCEDURE — 6370000000 HC RX 637 (ALT 250 FOR IP): Performed by: EMERGENCY MEDICINE

## 2022-05-25 RX ORDER — NAPROXEN 500 MG/1
500 TABLET ORAL 2 TIMES DAILY WITH MEALS
Qty: 60 TABLET | Refills: 5 | Status: SHIPPED | OUTPATIENT
Start: 2022-05-25

## 2022-05-25 RX ORDER — HYDROCODONE BITARTRATE AND ACETAMINOPHEN 5; 325 MG/1; MG/1
1 TABLET ORAL EVERY 4 HOURS PRN
Qty: 8 TABLET | Refills: 0 | Status: SHIPPED | OUTPATIENT
Start: 2022-05-25 | End: 2022-05-28

## 2022-05-25 RX ORDER — HYDROCODONE BITARTRATE AND ACETAMINOPHEN 5; 325 MG/1; MG/1
1 TABLET ORAL ONCE
Status: COMPLETED | OUTPATIENT
Start: 2022-05-25 | End: 2022-05-25

## 2022-05-25 RX ORDER — HYDROCODONE BITARTRATE AND ACETAMINOPHEN 5; 325 MG/1; MG/1
1 TABLET ORAL EVERY 4 HOURS PRN
Qty: 18 TABLET | Refills: 0 | Status: SHIPPED | OUTPATIENT
Start: 2022-05-25 | End: 2022-05-25 | Stop reason: SDUPTHER

## 2022-05-25 RX ADMIN — HYDROCODONE BITARTRATE AND ACETAMINOPHEN 1 TABLET: 5; 325 TABLET ORAL at 00:24

## 2022-05-25 ASSESSMENT — PAIN DESCRIPTION - LOCATION: LOCATION: WRIST

## 2022-05-25 ASSESSMENT — PAIN DESCRIPTION - ORIENTATION: ORIENTATION: LEFT

## 2022-05-25 ASSESSMENT — PAIN SCALES - GENERAL: PAINLEVEL_OUTOF10: 10

## 2022-05-25 ASSESSMENT — ENCOUNTER SYMPTOMS: COLOR CHANGE: 0

## 2022-05-25 NOTE — ED NOTES
Patient left via personal vehicle to private residence in stable condition. Patients vitals were assessed before discharge and were stable. Patient verbalized understanding of discharge instructions, follow up and medications. RN explained information in discharge packet and patient verbalized they have no questions at this time. Patient left ER with all paperwork and belongings that RN is aware of.         Oc Bradley RN  05/25/22 9321

## 2022-05-25 NOTE — ED PROVIDER NOTES
[address] [facility]  ED  EMERGENCY DEPARTMENTENCOUNTER      Pt Name: Jammie Matthews  MRN: [id number]  Carengfjesus 1965  Date ofevaluation: 5/24/2022  Provider: Kandis Jamison MD    CHIEF COMPLAINT       Chief Complaint   Patient presents with    Fall     Patient tripped and fell at home bracing herself with LUE, L wrist deformity noted in triage, ETOH on board.  Wrist Injury         HISTORY OF PRESENT ILLNESS   (Location/Symptom, Timing/Onset,Context/Setting, Quality, Duration, Modifying Factors, Severity)  Note limiting factors. Jammie Matthews is a 61 y.o. female  who  has a past medical history of Endometriosis, Fe deficiency anemia, and Irregular uterine bleeding. who presents to the emergency department for evaluation of left wrist injury. Patient reports that she fell forward on outstretched arm injuring her left wrist.  Reports that there was EtOH involved. Denies head injury or loss of consciousness. Denies numbness in the affected extremity. Denies elbow pain or shoulder pain. Patient denies additional injury. She did not taking medications for symptoms. HPI    NursingNotes were reviewed. REVIEW OF SYSTEMS    (2-9 systems for level 4, 10 or more for level 5)     Review of Systems   Musculoskeletal: Positive for arthralgias. Negative for neck pain and neck stiffness. Skin: Negative for color change and wound. Neurological: Negative for dizziness, weakness, light-headedness, numbness and headaches. All other systems reviewed and are negative. Except as noted above the remainder of the review of systems was reviewed and negative.        PAST MEDICAL HISTORY     Past Medical History:   Diagnosis Date    Endometriosis     Fe deficiency anemia     Irregular uterine bleeding          SURGICALHISTORY       Past Surgical History:   Procedure Laterality Date    ARM SURGERY Right 09/23/2016    orif distal radial fracture    HYSTERECTOMY  02/2008    subtotal    PELVIC LAPAROSCOPY  2008         CURRENT MEDICATIONS       Discharge Medication List as of 5/25/2022 12:42 AM      CONTINUE these medications which have NOT CHANGED    Details   predniSONE (DELTASONE) 1 MG tablet Take 1 mg by mouth dailyHistorical Med      phenazopyridine (PYRIDIUM) 200 MG tablet Take 200 mg by mouth 3 times daily as needed for Pain      dexlansoprazole (DEXILANT) 60 MG CPDR delayed release capsule Take 1 capsule by mouth daily, Disp-30 capsule, R-2      ibuprofen (ADVIL;MOTRIN) 600 MG tablet Take 1 tablet by mouth every 6 hours as needed for Pain, Disp-60 tablet, R-1                  Patient has no known allergies. FAMILY HISTORY       Family History   Problem Relation Age of Onset    Heart Disease Mother     Stroke Mother     Cancer Brother         lung    High Blood Pressure Sister     High Cholesterol Paternal Grandmother     Heart Disease Brother           SOCIAL HISTORY       Social History     Socioeconomic History    Marital status:      Spouse name: None    Number of children: None    Years of education: None    Highest education level: None   Occupational History    None   Tobacco Use    Smoking status: Never Smoker    Smokeless tobacco: Never Used   Substance and Sexual Activity    Alcohol use: Yes     Comment: occasion    Drug use: No    Sexual activity: Yes   Other Topics Concern    None   Social History Narrative    None     Social Determinants of Health     Financial Resource Strain:     Difficulty of Paying Living Expenses: Not on file   Food Insecurity:     Worried About Running Out of Food in the Last Year: Not on file    [place] of Food in the Last Year: Not on file   Transportation Needs:     Lack of Transportation (Medical): Not on file    Lack of Transportation (Non-Medical):  Not on file   Physical Activity:     Days of Exercise per Week: Not on file    Minutes of Exercise per Session: Not on file   Stress:     Feeling of Stress : Not on file Social Connections:     Frequency of Communication with Friends and Family: Not on file    Frequency of Social Gatherings with Friends and Family: Not on file    Attends Yarsani Services: Not on file    Active Member of Clubs or Organizations: Not on file    Attends Club or Organization Meetings: Not on file    Marital Status: Not on file   Intimate Partner Violence:     Fear of Current or Ex-Partner: Not on file    Emotionally Abused: Not on file    Physically Abused: Not on file    Sexually Abused: Not on file   Housing Stability:     Unable to Pay for Housing in the Last Year: Not on file    Number of [place] in the Last Year: Not on file    Unstable Housing in the Last Year: Not on file       SCREENINGS    [place] Coma Scale  Eye Opening: Spontaneous  Best Verbal Response: Oriented  Best Motor Response: Obeys commands  [place] Coma Scale Score: 15        PHYSICAL EXAM    (up to 7 for level 4, 8 or more for level 5)     ED Triage Vitals   BP Temp Temp Source Heart Rate Resp SpO2 Height Weight   05/24/22 2327 05/24/22 2328 05/24/22 2328 05/24/22 2327 05/24/22 2327 05/24/22 2327 05/24/22 2327 05/24/22 2327   127/88 97.8 °F (36.6 °C) Oral 87 18 98 % 5' 5\" (1.651 m) 180 lb (81.6 kg)       Physical Exam  Vitals and nursing note reviewed. Constitutional:       Appearance: She is well-developed. HENT:      Head: Normocephalic and atraumatic. Nose: Nose normal.      Mouth/Throat:      Mouth: Mucous membranes are moist.      Pharynx: Oropharynx is clear. Eyes:      Extraocular Movements: Extraocular movements intact. Conjunctiva/sclera: Conjunctivae normal.      Pupils: Pupils are equal, round, and reactive to light. Neck:      Trachea: No tracheal deviation. Cardiovascular:      Rate and Rhythm: Normal rate and regular rhythm. Pulses: Normal pulses. Heart sounds: Normal heart sounds.    Pulmonary:      Effort: Pulmonary effort is normal.      Breath sounds: Normal breath sounds. Abdominal:      General: There is no distension. Palpations: Abdomen is soft. Tenderness: There is no abdominal tenderness. Musculoskeletal:         General: Swelling, tenderness and signs of injury present. Normal range of motion. Cervical back: Normal range of motion. Skin:     General: Skin is warm and dry. Capillary Refill: Capillary refill takes less than 2 seconds. Findings: No erythema or lesion. Neurological:      General: No focal deficit present. Mental Status: She is alert and oriented to person, place, and time. Mental status is at baseline. Sensory: No sensory deficit. Motor: No weakness. Gait: Gait normal.         RESULTS     EKG: All EKG's are interpreted by the Emergency Department Physician who either signs or Co-signsthis chart in the absence of a cardiologist.        RADIOLOGY:   Johnella Belling such as CT, Ultrasound and MRI are read by the radiologist. Plain radiographic images are visualized and preliminarily interpreted by the emergency physician with the below findings:        Interpretation per the Radiologist below, if available at the time ofthis note:    XR WRIST LEFT (MIN 3 VIEWS)   Final Result   Comminuted and impacted intra-articular fracture of the distal radius with   mildly displaced ulnar styloid fracture. ED BEDSIDE ULTRASOUND:   Performed by ED Physician - none    LABS:  Labs Reviewed - No data to display    All other labs were within normal range or not returned as of this dictation.     EMERGENCY DEPARTMENT COURSE and DIFFERENTIAL DIAGNOSIS/MDM:   Vitals:    Vitals:    05/24/22 2328 05/24/22 2336 05/24/22 2356 05/25/22 0024   BP:  (!) 136/92 (!) 108/92 (!) 122/90   Pulse:  93 85 84   Resp:    18   Temp: 97.8 °F (36.6 °C)      TempSrc: Oral      SpO2:  99%  95%   Weight:       Height:           Patient was given thefollowing medications:  Medications   HYDROcodone-acetaminophen (NORCO) 5-325 MG per tablet 1 tablet (1 tablet Oral Given 5/25/22 0024)       ED COURSE & MEDICAL DECISION MAKING    Pertinent Labs & Imaging studies reviewed. (See chart for details)   -  Patient seen and evaluated in the emergency department. -  Triage and nursing notes reviewed and incorporated. -  Old chart records reviewed and incorporated. -  Differential diagnosis includes: Differential diagnosis: includes but not limited to Arterial Injury/Ischemia, Fracture, Dislocation, Infection, Compartment Syndrome, Neurologic Deficit/Injury. -  Work-up included:  See above  -  ED treatment included: See above  -  Results discussed with patient. Patient presents ED for evaluation of left wrist pain after an injury. Patient does have significant soft tissue swelling. Ulnar radial pulses intact. Motor function and sensation intact in the radial ulnar median nerves distally. .  No tenderness to palpation of the forearm elbow or shoulder. imaging studies show comminuted fracture of the distal portion of the radius with a displaced ulnar styloid fracture. MRI appears to be pretty well aligned and I believe the patient would benefit from attempted reduction at this time. Patient placed in a splint and sling. She was prepped with analgesics and Orthopedic referral.  Hand is neurovascular intact on reevaluation after placement of splint. Patient feels improved on reevaluation. Symptomatic treatment with expectant management discussed with the patient and they and/or family members present are amenable to treatment plan and outpatient follow-up. Strict return precautions were discussed with the patient and those present. They demonstrated understanding of when to return to the emergency department for new or worsening symptoms. .  The patient is agreeable with plan of care and disposition. REASSESSMENT          CRITICAL CARE TIME   Total Critical Care time was 0 minutes, excluding separately reportable procedures.   There was a high probability of clinically significant/life threatening deterioration in the patient's condition which required my urgent intervention. CONSULTS:  None    PROCEDURES:  Unless otherwise noted below, none     Procedures    FINAL IMPRESSION      1.  Closed fracture of distal ends of left radius and ulna, initial encounter          DISPOSITION/PLAN   DISPOSITION Decision To Discharge 05/25/2022 12:34:45 AM      PATIENT REFERREDTO:  Jessica Maciel MD  [place] [address]  [phone]    Schedule an appointment as soon as possible for a visit   As needed    [facility]  [phone]  Schedule an appointment as soon as possible for a visit   As needed      DISCHARGEMEDICATIONS:  Discharge Medication List as of 5/25/2022 12:42 AM      START taking these medications    Details   naproxen (NAPROSYN) 500 MG tablet Take 1 tablet by mouth 2 times daily (with meals), Disp-60 tablet, R-5Print                (Please note that portions of this note were completed with a voice recognition program.  Efforts were made to edit the dictations but occasionally words are mis-transcribed.)    Og Trivedi MD (electronically signed)  Attending Emergency Physician          Og Trivedi MD  05/25/22 2902

## 2022-05-25 NOTE — ED NOTES
Patient refusing IV start at this time d/t \"I do not like needles no needles put me to sleep if there is needles. \" Provider aware.      Dale Cali RN  05/24/22 1244

## 2024-05-07 ENCOUNTER — TELEPHONE (OUTPATIENT)
Dept: FAMILY MEDICINE CLINIC | Age: 59
End: 2024-05-07

## 2024-05-07 NOTE — TELEPHONE ENCOUNTER
Patient called because she wanted to schedule a new patient appt with Delicia Marie, she was previously scheduled to see Delicia as a new patient but no showed this appt and was marked as dismissed due to no show as new patient.  She is asking if Delicia would consider letting her reschedule a new patient appt.